# Patient Record
Sex: FEMALE | Race: WHITE | NOT HISPANIC OR LATINO | Employment: FULL TIME | URBAN - METROPOLITAN AREA
[De-identification: names, ages, dates, MRNs, and addresses within clinical notes are randomized per-mention and may not be internally consistent; named-entity substitution may affect disease eponyms.]

---

## 2024-01-22 ENCOUNTER — HOSPITAL ENCOUNTER (OUTPATIENT)
Facility: HOSPITAL | Age: 79
Discharge: HOME | End: 2024-01-22
Attending: STUDENT IN AN ORGANIZED HEALTH CARE EDUCATION/TRAINING PROGRAM | Admitting: STUDENT IN AN ORGANIZED HEALTH CARE EDUCATION/TRAINING PROGRAM
Payer: MEDICARE

## 2024-01-22 VITALS
WEIGHT: 185 LBS | HEART RATE: 70 BPM | HEIGHT: 65 IN | BODY MASS INDEX: 30.82 KG/M2 | SYSTOLIC BLOOD PRESSURE: 143 MMHG | TEMPERATURE: 98 F | DIASTOLIC BLOOD PRESSURE: 72 MMHG | RESPIRATION RATE: 16 BRPM | OXYGEN SATURATION: 95 %

## 2024-01-22 DIAGNOSIS — T78.3XXA ANGIOEDEMA OF TONGUE: Primary | ICD-10-CM

## 2024-01-22 PROBLEM — I49.3 PVC (PREMATURE VENTRICULAR CONTRACTION): Status: ACTIVE | Noted: 2024-01-22

## 2024-01-22 PROBLEM — I10 HTN (HYPERTENSION): Status: ACTIVE | Noted: 2024-01-22

## 2024-01-22 LAB
ALBUMIN SERPL-MCNC: 3.6 G/DL (ref 3.5–5.2)
ALBUMIN/GLOB SERPL: 1.2 G/DL
ALP SERPL-CCNC: 123 U/L (ref 39–117)
ALT SERPL W P-5'-P-CCNC: 18 U/L (ref 1–33)
ANION GAP SERPL CALCULATED.3IONS-SCNC: 11.2 MMOL/L (ref 5–15)
AST SERPL-CCNC: 17 U/L (ref 1–32)
BASOPHILS # BLD AUTO: 0.05 10*3/MM3 (ref 0–0.2)
BASOPHILS NFR BLD AUTO: 0.7 % (ref 0–1.5)
BILIRUB SERPL-MCNC: <0.2 MG/DL (ref 0–1.2)
BUN SERPL-MCNC: 17 MG/DL (ref 8–23)
BUN/CREAT SERPL: 23.3 (ref 7–25)
C3 SERPL-MCNC: 147 MG/DL (ref 82–167)
C4 SERPL-MCNC: 31 MG/DL (ref 14–44)
CALCIUM SPEC-SCNC: 9.4 MG/DL (ref 8.6–10.5)
CHLORIDE SERPL-SCNC: 105 MMOL/L (ref 98–107)
CO2 SERPL-SCNC: 24.8 MMOL/L (ref 22–29)
CREAT SERPL-MCNC: 0.73 MG/DL (ref 0.57–1)
CRP SERPL-MCNC: 0.36 MG/DL (ref 0–0.5)
DEPRECATED RDW RBC AUTO: 39.4 FL (ref 37–54)
EGFRCR SERPLBLD CKD-EPI 2021: 85.4 ML/MIN/1.73
EOSINOPHIL # BLD AUTO: 0.34 10*3/MM3 (ref 0–0.4)
EOSINOPHIL NFR BLD AUTO: 4.8 % (ref 0.3–6.2)
ERYTHROCYTE [DISTWIDTH] IN BLOOD BY AUTOMATED COUNT: 11.8 % (ref 12.3–15.4)
GLOBULIN UR ELPH-MCNC: 2.9 GM/DL
GLUCOSE SERPL-MCNC: 93 MG/DL (ref 65–99)
HCT VFR BLD AUTO: 36.2 % (ref 34–46.6)
HGB BLD-MCNC: 11.8 G/DL (ref 12–15.9)
IMM GRANULOCYTES # BLD AUTO: 0.02 10*3/MM3 (ref 0–0.05)
IMM GRANULOCYTES NFR BLD AUTO: 0.3 % (ref 0–0.5)
LYMPHOCYTES # BLD AUTO: 2.2 10*3/MM3 (ref 0.7–3.1)
LYMPHOCYTES NFR BLD AUTO: 30.8 % (ref 19.6–45.3)
MAGNESIUM SERPL-MCNC: 2 MG/DL (ref 1.6–2.4)
MCH RBC QN AUTO: 30.2 PG (ref 26.6–33)
MCHC RBC AUTO-ENTMCNC: 32.6 G/DL (ref 31.5–35.7)
MCV RBC AUTO: 92.6 FL (ref 79–97)
MONOCYTES # BLD AUTO: 0.82 10*3/MM3 (ref 0.1–0.9)
MONOCYTES NFR BLD AUTO: 11.5 % (ref 5–12)
NEUTROPHILS NFR BLD AUTO: 3.72 10*3/MM3 (ref 1.7–7)
NEUTROPHILS NFR BLD AUTO: 51.9 % (ref 42.7–76)
NRBC BLD AUTO-RTO: 0 /100 WBC (ref 0–0.2)
PLATELET # BLD AUTO: 210 10*3/MM3 (ref 140–450)
PMV BLD AUTO: 9.6 FL (ref 6–12)
POTASSIUM SERPL-SCNC: 4 MMOL/L (ref 3.5–5.2)
PROT SERPL-MCNC: 6.5 G/DL (ref 6–8.5)
RBC # BLD AUTO: 3.91 10*6/MM3 (ref 3.77–5.28)
SODIUM SERPL-SCNC: 141 MMOL/L (ref 136–145)
WBC NRBC COR # BLD AUTO: 7.15 10*3/MM3 (ref 3.4–10.8)

## 2024-01-22 PROCEDURE — 86160 COMPLEMENT ANTIGEN: CPT | Performed by: HOSPITALIST

## 2024-01-22 PROCEDURE — 86140 C-REACTIVE PROTEIN: CPT | Performed by: HOSPITALIST

## 2024-01-22 PROCEDURE — 96365 THER/PROPH/DIAG IV INF INIT: CPT

## 2024-01-22 PROCEDURE — 25010000002 DEXAMETHASONE PER 1 MG: Performed by: STUDENT IN AN ORGANIZED HEALTH CARE EDUCATION/TRAINING PROGRAM

## 2024-01-22 PROCEDURE — 83735 ASSAY OF MAGNESIUM: CPT | Performed by: HOSPITALIST

## 2024-01-22 PROCEDURE — G0378 HOSPITAL OBSERVATION PER HR: HCPCS

## 2024-01-22 PROCEDURE — 96376 TX/PRO/DX INJ SAME DRUG ADON: CPT

## 2024-01-22 PROCEDURE — A9270 NON-COVERED ITEM OR SERVICE: HCPCS | Performed by: HOSPITALIST

## 2024-01-22 PROCEDURE — 99291 CRITICAL CARE FIRST HOUR: CPT

## 2024-01-22 PROCEDURE — 25010000002 EPINEPHRINE PER 0.1 MG: Performed by: STUDENT IN AN ORGANIZED HEALTH CARE EDUCATION/TRAINING PROGRAM

## 2024-01-22 PROCEDURE — 63710000001 PREDNISONE PER 1 MG: Performed by: HOSPITALIST

## 2024-01-22 PROCEDURE — 25010000002 DIPHENHYDRAMINE PER 50 MG: Performed by: STUDENT IN AN ORGANIZED HEALTH CARE EDUCATION/TRAINING PROGRAM

## 2024-01-22 PROCEDURE — 85025 COMPLETE CBC W/AUTO DIFF WBC: CPT | Performed by: STUDENT IN AN ORGANIZED HEALTH CARE EDUCATION/TRAINING PROGRAM

## 2024-01-22 PROCEDURE — 25010000002 DIPHENHYDRAMINE PER 50 MG: Performed by: HOSPITALIST

## 2024-01-22 PROCEDURE — 80053 COMPREHEN METABOLIC PANEL: CPT | Performed by: STUDENT IN AN ORGANIZED HEALTH CARE EDUCATION/TRAINING PROGRAM

## 2024-01-22 PROCEDURE — 63710000001 CETIRIZINE 10 MG TABLET: Performed by: HOSPITALIST

## 2024-01-22 PROCEDURE — 96375 TX/PRO/DX INJ NEW DRUG ADDON: CPT

## 2024-01-22 PROCEDURE — 96372 THER/PROPH/DIAG INJ SC/IM: CPT

## 2024-01-22 RX ORDER — LOSARTAN POTASSIUM 50 MG/1
100 TABLET ORAL DAILY
COMMUNITY
End: 2024-01-22 | Stop reason: HOSPADM

## 2024-01-22 RX ORDER — SODIUM CHLORIDE 9 MG/ML
40 INJECTION, SOLUTION INTRAVENOUS AS NEEDED
Status: DISCONTINUED | OUTPATIENT
Start: 2024-01-22 | End: 2024-01-22 | Stop reason: HOSPADM

## 2024-01-22 RX ORDER — SODIUM CHLORIDE 0.9 % (FLUSH) 0.9 %
10 SYRINGE (ML) INJECTION AS NEEDED
Status: DISCONTINUED | OUTPATIENT
Start: 2024-01-22 | End: 2024-01-22 | Stop reason: HOSPADM

## 2024-01-22 RX ORDER — OMEPRAZOLE 20 MG/1
20 CAPSULE, DELAYED RELEASE ORAL DAILY
COMMUNITY

## 2024-01-22 RX ORDER — PREDNISONE 20 MG/1
20 TABLET ORAL DAILY
Qty: 3 TABLET | Refills: 0 | Status: SHIPPED | OUTPATIENT
Start: 2024-01-22

## 2024-01-22 RX ORDER — CARVEDILOL 3.12 MG/1
3.12 TABLET ORAL 2 TIMES DAILY WITH MEALS
Qty: 60 TABLET | Refills: 0 | Status: SHIPPED | OUTPATIENT
Start: 2024-01-22

## 2024-01-22 RX ORDER — OMEGA-3-ACID ETHYL ESTERS 1 G/1
2 CAPSULE, LIQUID FILLED ORAL 2 TIMES DAILY
COMMUNITY

## 2024-01-22 RX ORDER — DEXAMETHASONE SODIUM PHOSPHATE 10 MG/ML
8 INJECTION INTRAMUSCULAR; INTRAVENOUS ONCE
Status: COMPLETED | OUTPATIENT
Start: 2024-01-22 | End: 2024-01-22

## 2024-01-22 RX ORDER — FAMOTIDINE 10 MG/ML
20 INJECTION, SOLUTION INTRAVENOUS EVERY 12 HOURS SCHEDULED
Status: DISCONTINUED | OUTPATIENT
Start: 2024-01-22 | End: 2024-01-22 | Stop reason: HOSPADM

## 2024-01-22 RX ORDER — DIPHENHYDRAMINE HYDROCHLORIDE 50 MG/ML
25 INJECTION INTRAMUSCULAR; INTRAVENOUS EVERY 6 HOURS
Status: DISCONTINUED | OUTPATIENT
Start: 2024-01-22 | End: 2024-01-22 | Stop reason: HOSPADM

## 2024-01-22 RX ORDER — BUTALBITAL, ACETAMINOPHEN AND CAFFEINE 50; 325; 40 MG/1; MG/1; MG/1
1 TABLET ORAL EVERY 4 HOURS PRN
COMMUNITY

## 2024-01-22 RX ORDER — FAMOTIDINE 10 MG/ML
20 INJECTION, SOLUTION INTRAVENOUS ONCE
Status: COMPLETED | OUTPATIENT
Start: 2024-01-22 | End: 2024-01-22

## 2024-01-22 RX ORDER — CETIRIZINE HYDROCHLORIDE 10 MG/1
10 TABLET ORAL DAILY
Qty: 3 TABLET | Refills: 0 | Status: SHIPPED | OUTPATIENT
Start: 2024-01-23

## 2024-01-22 RX ORDER — TRANEXAMIC ACID 10 MG/ML
1000 INJECTION, SOLUTION INTRAVENOUS ONCE
Status: COMPLETED | OUTPATIENT
Start: 2024-01-22 | End: 2024-01-22

## 2024-01-22 RX ORDER — DIPHENHYDRAMINE HYDROCHLORIDE 50 MG/ML
25 INJECTION INTRAMUSCULAR; INTRAVENOUS ONCE
Status: COMPLETED | OUTPATIENT
Start: 2024-01-22 | End: 2024-01-22

## 2024-01-22 RX ORDER — SODIUM CHLORIDE 0.9 % (FLUSH) 0.9 %
10 SYRINGE (ML) INJECTION EVERY 12 HOURS SCHEDULED
Status: DISCONTINUED | OUTPATIENT
Start: 2024-01-22 | End: 2024-01-22 | Stop reason: HOSPADM

## 2024-01-22 RX ORDER — FAMOTIDINE 20 MG/1
20 TABLET, FILM COATED ORAL 2 TIMES DAILY
Qty: 6 TABLET | Refills: 0 | Status: SHIPPED | OUTPATIENT
Start: 2024-01-22

## 2024-01-22 RX ORDER — ACETAMINOPHEN 325 MG/1
650 TABLET ORAL EVERY 4 HOURS PRN
Status: DISCONTINUED | OUTPATIENT
Start: 2024-01-22 | End: 2024-01-22 | Stop reason: HOSPADM

## 2024-01-22 RX ORDER — THYROID 60 MG/1
60 TABLET ORAL DAILY
COMMUNITY

## 2024-01-22 RX ORDER — PREDNISONE 20 MG/1
40 TABLET ORAL
Status: DISCONTINUED | OUTPATIENT
Start: 2024-01-22 | End: 2024-01-22 | Stop reason: HOSPADM

## 2024-01-22 RX ORDER — CETIRIZINE HYDROCHLORIDE 10 MG/1
10 TABLET ORAL DAILY
Status: DISCONTINUED | OUTPATIENT
Start: 2024-01-22 | End: 2024-01-22 | Stop reason: HOSPADM

## 2024-01-22 RX ORDER — PROPRANOLOL HCL 60 MG
60 CAPSULE, EXTENDED RELEASE 24HR ORAL DAILY
COMMUNITY
End: 2024-01-22 | Stop reason: HOSPADM

## 2024-01-22 RX ORDER — CARVEDILOL 3.12 MG/1
3.12 TABLET ORAL 2 TIMES DAILY WITH MEALS
Status: DISCONTINUED | OUTPATIENT
Start: 2024-01-22 | End: 2024-01-22 | Stop reason: HOSPADM

## 2024-01-22 RX ADMIN — DEXAMETHASONE SODIUM PHOSPHATE 8 MG: 10 INJECTION INTRAMUSCULAR; INTRAVENOUS at 05:23

## 2024-01-22 RX ADMIN — DIPHENHYDRAMINE HYDROCHLORIDE 25 MG: 50 INJECTION, SOLUTION INTRAMUSCULAR; INTRAVENOUS at 05:22

## 2024-01-22 RX ADMIN — DIPHENHYDRAMINE HYDROCHLORIDE 25 MG: 50 INJECTION, SOLUTION INTRAMUSCULAR; INTRAVENOUS at 10:27

## 2024-01-22 RX ADMIN — FAMOTIDINE 20 MG: 10 INJECTION INTRAVENOUS at 10:28

## 2024-01-22 RX ADMIN — EPINEPHRINE 0.3 MG: 1 INJECTION, SOLUTION, CONCENTRATE INTRAVENOUS at 05:41

## 2024-01-22 RX ADMIN — CETIRIZINE HYDROCHLORIDE 10 MG: 10 TABLET ORAL at 11:58

## 2024-01-22 RX ADMIN — Medication 10 ML: at 10:28

## 2024-01-22 RX ADMIN — PREDNISONE 40 MG: 20 TABLET ORAL at 11:58

## 2024-01-22 RX ADMIN — TRANEXAMIC ACID 1000 MG: 10 INJECTION, SOLUTION INTRAVENOUS at 05:38

## 2024-01-22 RX ADMIN — FAMOTIDINE 20 MG: 10 INJECTION INTRAVENOUS at 05:24

## 2024-01-22 NOTE — DISCHARGE SUMMARY
"    Patient Name: Antionette Campuzano  : 1945  MRN: [id number]    Date of Admission: 2024  Date of Discharge:  2024  Primary Care Physician: Sacha Conroy MD      Chief Complaint:   Oral Swelling      Discharge Diagnoses     Active Hospital Problems    Diagnosis  POA    **Angioedema of tongue [T78.3XXA]  Yes    HTN (hypertension) [I10]  Yes    PVC (premature ventricular contraction) [I49.3]  Yes      Resolved Hospital Problems   No resolved problems to display.        Hospital Course     Ms. Campuzano is a 78 y.o. female with a history of HTN presenting with acute onset of tongue swelling as described in the H&P.  This occurred around 3 AM and she presented here a couple of hours later.  She was placed in observation due to concerns for airway given the tongue swelling although she had started to improve slightly by the time she left the emergency room.  After getting to the floor she has had dramatic improvement in her tongue swelling.  From the time of my first evaluation for this morning to now she has had significant decrease in the tongue swelling and now says her tongue feels normal.  She is stable for discharge at this time.  I will continue prednisone for couple of days and also encouraged her to continue cetirizine and Pepcid.  She can use Benadryl as needed although she says it makes her feel \"hyper\" and she does not like to take it.  She is going to need something to replace the losartan.  I noted numerous PVCs on her cardiac monitor and I think she would benefit from a beta-blocker that would have better activity for blood pressure control like carvedilol.  I have started that now.  She is probably needs another agent as well and I would consider either diuretic or amlodipine but she would like to discuss that further with her primary care physician after discharge which I think is reasonable.  Plan to discharge home this afternoon.  I encouraged her to follow-up with an allergist after " "discharge as well which she is very motivated to do    Day of Discharge     Subjective:  Feeling normal now    Physical Exam:  Temp:  [97.3 °F (36.3 °C)-98 °F (36.7 °C)] 98 °F (36.7 °C)  Heart Rate:  [63-75] 70  Resp:  [16-18] 16  BP: (163-190)/(73-85) 172/73  Body mass index is 30.79 kg/m².  Physical Exam  Vitals reviewed.   HENT:      Mouth/Throat:      Comments: Tongue appears relatively normal, decreased in size from 3 to 4 hours ago when I saw her last.  Neurological:      Mental Status: She is alert.         Consultants     Consult Orders (all) (From admission, onward)       Start     Ordered    01/22/24 0630  [facility] (on-call MD unless specified) Details  Once        Specialty:  Hospitalist  Provider:  (Not yet assigned)    01/22/24 0629    01/22/24 0619  [facility] (on-call MD unless specified) Details  Once        Specialty:  Hospitalist  Provider:  (Not yet assigned)    01/22/24 0618                  Procedures       Imaging Results (All)       None              Pertinent Labs     Results from last 7 days   Lab Units 01/22/24  0538   WBC 10*3/mm3 7.15   HEMOGLOBIN g/dL 11.8*   PLATELETS 10*3/mm3 210     Results from last 7 days   Lab Units 01/22/24  0538   SODIUM mmol/L 141   POTASSIUM mmol/L 4.0   CHLORIDE mmol/L 105   CO2 mmol/L 24.8   BUN mg/dL 17   CREATININE mg/dL 0.73   GLUCOSE mg/dL 93   EGFR mL/min/1.73 85.4     Results from last 7 days   Lab Units 01/22/24  0538   ALBUMIN g/dL 3.6   BILIRUBIN mg/dL <0.2   ALK PHOS U/L 123*   AST (SGOT) U/L 17   ALT (SGPT) U/L 18     Results from last 7 days   Lab Units 01/22/24  0538   CALCIUM mg/dL 9.4   ALBUMIN g/dL 3.6   MAGNESIUM mg/dL 2.0               Invalid input(s): \"LDLCALC\"          Test Results Pending at Discharge       Discharge Details        Discharge Medications        New Medications        Instructions Start Date   carvedilol 3.125 MG tablet  Commonly known as: COREG   3.125 mg, Oral, 2 Times Daily With Meals      cetirizine 10 MG tablet  Commonly known " as: zyrTEC   10 mg, Oral, Daily   Start Date: January 23, 2024     famotidine 20 MG tablet  Commonly known as: Pepcid   20 mg, Oral, 2 Times Daily      predniSONE 20 MG tablet  Commonly known as: DELTASONE   20 mg, Oral, Daily             Continue These Medications        Instructions Start Date   butalbital-acetaminophen-caffeine -40 MG per tablet  Commonly known as: FIORICET, ESGIC   1 tablet, Oral, Every 4 Hours PRN      Estrogens Conjugated 0.625 MG/GM vaginal cream  Commonly known as: PREMARIN   1 g, Vaginal, Daily      Lovaza 1 g capsule  Generic drug: omega-3 acid ethyl esters   2 g, Oral, 2 Times Daily      omeprazole 20 MG capsule  Commonly known as: priLOSEC   20 mg, Oral, Daily      Thyroid 60 MG tablet  Commonly known as: ARMOUR   60 mg, Oral, Daily             Stop These Medications      losartan 50 MG tablet  Commonly known as: COZAAR     propranolol LA 60 MG 24 hr capsule  Commonly known as: INDERAL LA              No Known Allergies    Discharge Disposition:  Home or Self Care      Discharge Diet:  Diet Order   Procedures    Diet: Regular/House Diet; Texture: Regular Texture (IDDSI 7); Fluid Consistency: Thin (IDDSI 0)       Discharge Activity:       CODE STATUS:    Code Status and Medical Interventions:   Ordered at: 01/22/24 0848     Code Status (Patient has no pulse and is not breathing):    CPR (Attempt to Resuscitate)     Medical Interventions (Patient has pulse or is breathing):    Full Support       No future appointments.   Follow-up Information       Sacha Conroy MD .    Specialty: Internal Medicine  Contact information:  [facility] [address] [facility] [address] [phone]                             Time Spent on Discharge:  Greater than 30 minutes      Ed Silvestre MD  [facility]  01/22/24  14:48 EST

## 2024-01-22 NOTE — PLAN OF CARE
Goal Outcome Evaluation:   Pt admitted to unit for Angioedema-tolerating foods and liquid with minimal tongue edema. GCS 15-remains on room air, denies pain or shortness of air. Will continue to monitor.

## 2024-01-22 NOTE — ED PROVIDER NOTES
EMERGENCY DEPARTMENT ENCOUNTER    Room Number:  12/12  PCP: Sacha Conroy MD  History obtained from: Patient      HPI:  Chief Complaint: Tongue swelling  A complete HPI/ROS/PMH/PSH/SH/FH are unobtainable due to: Not applicable  Context: Antionette Campuzano is a 78 y.o. female who presents to the ED c/o tongue swelling, difficulty swallowing.  Started last night.  Has never had this before.  Medications or new exposures that she is aware of.  No history of food allergies.  Currently on losartan.            PAST MEDICAL HISTORY  Active Ambulatory Problems     Diagnosis Date Noted    No Active Ambulatory Problems     Resolved Ambulatory Problems     Diagnosis Date Noted    No Resolved Ambulatory Problems     No Additional Past Medical History         PAST SURGICAL HISTORY  No past surgical history on file.      FAMILY HISTORY  No family history on file.      SOCIAL HISTORY  Social History     Socioeconomic History    Marital status:          ALLERGIES  Patient has no known allergies.        REVIEW OF SYSTEMS    As per HPI      PHYSICAL EXAM  ED Triage Vitals   Temp Heart Rate Resp BP SpO2   01/22/24 0456 01/22/24 0456 01/22/24 0456 01/22/24 0503 01/22/24 0456   97.3 °F (36.3 °C) 75 18 (!) 190/84 95 %      Temp src Heart Rate Source Patient Position BP Location FiO2 (%)   01/22/24 0456 01/22/24 0456 01/22/24 0503 01/22/24 0503 --   Tympanic Monitor Lying Right arm        Physical Exam  Constitutional:       General: She is not in acute distress.  HENT:      Head: Normocephalic and atraumatic.      Mouth/Throat:      Comments: Significant swelling of the tongue with slight muffling of voice, managing secretions without difficulty, no stridor  Cardiovascular:      Rate and Rhythm: Normal rate and regular rhythm.   Pulmonary:      Effort: Pulmonary effort is normal. No respiratory distress.   Abdominal:      General: There is no distension.      Palpations: Abdomen is soft.      Tenderness: There is no abdominal  tenderness.   Musculoskeletal:         General: No swelling or deformity.   Skin:     General: Skin is warm and dry.   Neurological:      Mental Status: She is alert. Mental status is at baseline.           Vital signs and nursing notes reviewed.          LAB RESULTS  Recent Results (from the past 24 hour(s))   Comprehensive Metabolic Panel    Collection Time: 01/22/24  5:38 AM    Specimen: Blood   Result Value Ref Range    Glucose 93 65 - 99 mg/dL    BUN 17 8 - 23 mg/dL    Creatinine 0.73 0.57 - 1.00 mg/dL    Sodium 141 136 - 145 mmol/L    Potassium 4.0 3.5 - 5.2 mmol/L    Chloride 105 98 - 107 mmol/L    CO2 24.8 22.0 - 29.0 mmol/L    Calcium 9.4 8.6 - 10.5 mg/dL    Total Protein 6.5 6.0 - 8.5 g/dL    Albumin 3.6 3.5 - 5.2 g/dL    ALT (SGPT) 18 1 - 33 U/L    AST (SGOT) 17 1 - 32 U/L    Alkaline Phosphatase 123 (H) 39 - 117 U/L    Total Bilirubin <0.2 0.0 - 1.2 mg/dL    Globulin 2.9 gm/dL    A/G Ratio 1.2 g/dL    BUN/Creatinine Ratio 23.3 7.0 - 25.0    Anion Gap 11.2 5.0 - 15.0 mmol/L    eGFR 85.4 >60.0 mL/min/1.73   CBC Auto Differential    Collection Time: 01/22/24  5:38 AM    Specimen: Blood   Result Value Ref Range    WBC 7.15 3.40 - 10.80 10*3/mm3    RBC 3.91 3.77 - 5.28 10*6/mm3    Hemoglobin 11.8 (L) 12.0 - 15.9 g/dL    Hematocrit 36.2 34.0 - 46.6 %    MCV 92.6 79.0 - 97.0 fL    MCH 30.2 26.6 - 33.0 pg    MCHC 32.6 31.5 - 35.7 g/dL    RDW 11.8 (L) 12.3 - 15.4 %    RDW-SD 39.4 37.0 - 54.0 fl    MPV 9.6 6.0 - 12.0 fL    Platelets 210 140 - 450 10*3/mm3    Neutrophil % 51.9 42.7 - 76.0 %    Lymphocyte % 30.8 19.6 - 45.3 %    Monocyte % 11.5 5.0 - 12.0 %    Eosinophil % 4.8 0.3 - 6.2 %    Basophil % 0.7 0.0 - 1.5 %    Immature Grans % 0.3 0.0 - 0.5 %    Neutrophils, Absolute 3.72 1.70 - 7.00 10*3/mm3    Lymphocytes, Absolute 2.20 0.70 - 3.10 10*3/mm3    Monocytes, Absolute 0.82 0.10 - 0.90 10*3/mm3    Eosinophils, Absolute 0.34 0.00 - 0.40 10*3/mm3    Basophils, Absolute 0.05 0.00 - 0.20 10*3/mm3    Immature  Grans, Absolute 0.02 0.00 - 0.05 10*3/mm3    nRBC 0.0 0.0 - 0.2 /100 WBC       Ordered the above labs and reviewed the results.        RADIOLOGY  No Radiology Exams Resulted Within Past 24 Hours    Ordered the above noted radiological studies. Reviewed by me in PACS.            PROCEDURES  Critical Care    Performed by: Jimmy Lemus MD  Authorized by: Jimmy Lemus MD    Critical care provider statement:     Critical care time (minutes):  36    Critical care time was exclusive of:  Separately billable procedures and treating other patients and teaching time    Critical care was necessary to treat or prevent imminent or life-threatening deterioration of the following conditions:  Respiratory failure    Critical care was time spent personally by me on the following activities:  Development of treatment plan with patient or surrogate, discussions with consultants, evaluation of patient's response to treatment, examination of patient, ordering and performing treatments and interventions, ordering and review of laboratory studies, ordering and review of radiographic studies, pulse oximetry and re-evaluation of patient's condition    Care discussed with: admitting provider          MEDICATIONS GIVEN IN ER  Medications   EPINEPHrine (ANAPHYLAXIS) 1 mg/ml injection kit (0.3 mg Intramuscular Not Given 1/22/24 0546)   tranexamic acid 1000 mg in 100 mL 0.7% NaCl infusion (premix) (1,000 mg Intravenous New Bag 1/22/24 0538)   EPINEPHrine (ANAPHYLAXIS) 1 mg/ml injection kit (0.3 mg Intramuscular Given 1/22/24 0541)   dexAMETHasone (DECADRON) injection 8 mg (8 mg Intravenous Given 1/22/24 0523)   famotidine (PEPCID) injection 20 mg (20 mg Intravenous Given 1/22/24 0524)   diphenhydrAMINE (BENADRYL) injection 25 mg (25 mg Intravenous Given 1/22/24 0522)               MEDICAL DECISION MAKING, PROGRESS, and CONSULTS    MDM: Patient presented emergency department with significant tongue edema, voice changes.  Is well-appearing,  vitals otherwise stable.  Labs otherwise reassuring.  Treated with TXA, Decadron, epi, antihistamines.  Unclear etiology, suspect bradykinin induced although unclear trigger.  Observed in the emergency department with no worsening of symptoms, possible slight improvement.  Discussed with inpatient team, will admit for observation and further airway management as needed.    All labs have been independently reviewed by me.  All radiology studies have been reviewed by me and I have also reviewed the radiology report.   EKG's independently viewed and interpreted by me.  Discussion below represents my analysis of pertinent findings related to patient's condition, differential diagnosis, treatment plan and final disposition.      Additional sources:  - Discussed/ obtained information from independent historians:      - External (non-ED) record review:     - Chronic or social conditions impacting care:     - Shared decision making: Discussed plan for admission for observation, patient agrees      Orders placed during this visit:  Orders Placed This Encounter   Procedures    Comprehensive Metabolic Panel    CBC Auto Differential    [facility] (on-call MD unless specified) Details    [facility] (on-call MD unless specified) Details    Initiate Observation Status    CBC & Differential         Additional orders considered but not ordered:  Considered CT neck for further evaluation of the glottic structures however would prefer direct visualization if needed, will defer for now.        Differential diagnosis includes but is not limited to:    Angioedema, hereditary angioedema, medication induced angioedema, allergic angioedema, allergy, anaphylaxis      Independent interpretation of labs, radiology studies, and discussions with consultants:           DIAGNOSIS  Final diagnoses:   Angioedema of tongue         DISPOSITION  Admitted to telemetry        Latest Documented Vital Signs:  As of 06:39 EST  BP- (!) 190/84 HR- 75 Temp- 97.3 °F (36.3  °C) (Tympanic) O2 sat- 95%              --    Please note that portions of this were completed with a voice recognition program.       Note Disclaimer: At [facility], we believe that sharing information builds trust and better relationships. You are receiving this note because you are receiving care at [facility] or recently visited. It is possible you will see health information before a provider has talked with you about it. This kind of information can be easy to misunderstand. To help you fully understand what it means for your health, we urge you to discuss this note with your provider.             Jimmy Lemus MD  01/22/24 0609

## 2024-01-22 NOTE — CASE MANAGEMENT/SOCIAL WORK
Discharge Planning Assessment  [facility]     Patient Name: Antionette Campuzano  MRN: [id number]  Today's Date: 1/22/2024    Admit Date: 1/22/2024    Plan: Home, family to transport   Discharge Needs Assessment       Row Name 01/22/24 1302       Living Environment    People in Home spouse    Current Living Arrangements home    In the past 12 months has the electric, gas, oil, or water company threatened to shut off services in your home? Yes    Primary Care Provided by self    Provides Primary Care For no one    Family Caregiver if Needed spouse    Able to Return to Prior Arrangements yes       Resource/Environmental Concerns    Resource/Environmental Concerns none       Transition Planning    Patient/Family Anticipates Transition to home with family    Patient/Family Anticipated Services at Transition none    Transportation Anticipated family or friend will provide       Discharge Needs Assessment    Equipment Currently Used at Home none    Concerns to be Addressed discharge planning    Equipment Needed After Discharge none                   Discharge Plan       Row Name 01/22/24 5255       Plan    Plan Home, family to transport    Patient/Family in Agreement with Plan yes    Plan Comments CCP spoke with patient at bedside; explained role, verified facesheet, and discussed dc plan. Patient uses no DME and is independent for mobility at baseline. She lives with her spouse in a 3 level home with 12 steps to her bedroom and 3 steps to enter. She reports no trouble with ambulation throughout her home. She has no history of HH or SNF. Patient reports plan is home and denies any HH or DME needs. Family to transport. SAMY CASTILLO                  Continued Care and Services - Admitted Since 1/22/2024    Coordination has not been started for this encounter.          Demographic Summary       Row Name 01/22/24 1308       General Information    Admission Type observation    Arrived From home    Referral Source admission list     Reason for Consult discharge planning    Preferred Language English       Contact Information    Permission Granted to Share Info With family/designee                   Functional Status       Row Name 01/22/24 1301       Functional Status    Usual Activity Tolerance good    Current Activity Tolerance good       Functional Status, IADL    Medications independent    Meal Preparation independent    Housekeeping independent    Laundry independent    Shopping independent       Mental Status    General Appearance WDL WDL                   Psychosocial    No documentation.                  Abuse/Neglect    No documentation.                  Legal    No documentation.                  Substance Abuse    No documentation.                  Patient Forms    No documentation.                     SAMY Tamayo

## 2024-01-22 NOTE — CASE MANAGEMENT/SOCIAL WORK
Case Management Discharge Note      Final Note: home no needs         Selected Continued Care - Discharged on 1/22/2024 Admission date: 1/22/2024 - Discharge disposition: Home or Self Care      Destination    No services have been selected for the patient.                Durable Medical Equipment    No services have been selected for the patient.                Dialysis/Infusion    No services have been selected for the patient.                Home Medical Care    No services have been selected for the patient.                Therapy    No services have been selected for the patient.                Community Resources    No services have been selected for the patient.                Community & DME    No services have been selected for the patient.                         Final Discharge Disposition Code: 01 - home or self-care

## 2024-01-22 NOTE — ED TRIAGE NOTES
"Pt arrives ambulatory to triage desk via PV.    Pt states \"I went to a dinner party last night, and woke up with my tongue swollen about an hour ago.\"    Pt is having difficulty speaking and is having a hard time keeping her secretions down. Pt denies any allergies at this time, reports taking losartan and propanolol for BP.  "

## 2024-01-22 NOTE — ED NOTES
"Nursing report ED to floor  Antionette Campuzano  78 y.o.  female    HPI :   Chief Complaint   Patient presents with    Oral Swelling       Admitting doctor:   Ed Silvestre MD    Admitting diagnosis:   The encounter diagnosis was Angioedema of tongue.    Code status:   Current Code Status       Date Active Code Status Order ID Comments User Context       Not on file            Allergies:   Patient has no known allergies.    Isolation:   No active isolations    Intake and Output  No intake or output data in the 24 hours ending 01/22/24 0645    Weight:       01/22/24 0456   Weight: 83.9 kg (185 lb)       Most recent vitals:   Vitals:    01/22/24 0456 01/22/24 0503   BP:  (!) 190/84   BP Location:  Right arm   Patient Position:  Lying   Pulse: 75    Resp: 18    Temp: 97.3 °F (36.3 °C)    TempSrc: Tympanic    SpO2: 95%    Weight: 83.9 kg (185 lb)    Height: 165.1 cm (65\")        Active LDAs/IV Access:   Lines, Drains & Airways       Active LDAs       Name Placement date Placement time Site Days    Peripheral IV 01/22/24 0522 Right Antecubital 01/22/24 0522  Antecubital  less than 1                    Labs (abnormal labs have a star):   Labs Reviewed   COMPREHENSIVE METABOLIC PANEL - Abnormal; Notable for the following components:       Result Value    Alkaline Phosphatase 123 (*)     All other components within normal limits    Narrative:     GFR Normal >60  Chronic Kidney Disease <60  Kidney Failure <15    The GFR formula is only valid for adults with stable renal function between ages 18 and 70.   CBC WITH AUTO DIFFERENTIAL - Abnormal; Notable for the following components:    Hemoglobin 11.8 (*)     RDW 11.8 (*)     All other components within normal limits   CBC AND DIFFERENTIAL    Narrative:     The following orders were created for panel order CBC & Differential.  Procedure                               Abnormality         Status                     ---------                               -----------        "  ------                     CBC Auto Differential[[id number]]        Abnormal            Final result                 Please view results for these tests on the individual orders.       EKG:   No orders to display       Meds given in ED:   Medications   EPINEPHrine (ANAPHYLAXIS) 1 mg/ml injection kit (0.3 mg Intramuscular Not Given 1/22/24 0546)   tranexamic acid 1000 mg in 100 mL 0.7% NaCl infusion (premix) (1,000 mg Intravenous New Bag 1/22/24 0538)   EPINEPHrine (ANAPHYLAXIS) 1 mg/ml injection kit (0.3 mg Intramuscular Given 1/22/24 0541)   dexAMETHasone (DECADRON) injection 8 mg (8 mg Intravenous Given 1/22/24 0523)   famotidine (PEPCID) injection 20 mg (20 mg Intravenous Given 1/22/24 0524)   diphenhydrAMINE (BENADRYL) injection 25 mg (25 mg Intravenous Given 1/22/24 0522)       Imaging results:  No radiology results for the last day    Ambulatory status:   - up at yan    Social issues:   Social History     Socioeconomic History    Marital status:        NIH Stroke Scale:       Barbara Linn RN  01/22/24 06:45 EST

## 2024-01-22 NOTE — H&P
"    Patient Name:  Antionette Campuzano  YOB: 1945  MRN:  [id number]  Admit Date:  1/22/2024  Patient Care Team:  Sacha Conroy MD as PCP - General (Internal Medicine)      Subjective   History Present Illness     Chief Complaint   Patient presents with    Oral Swelling       Ms. Campuzano is a 78 y.o. female with history of hypertension presenting today with acute onset of tongue swelling.  It woke her up from sleep around 3 AM.  It was not painful but she just said she could tell something was wrong because her tongue \"felt funny\".  She started to feel some unusual sensation in the roof of her mouth as well which is what finally prompted her to come to the emergency room.  She has not recently had any new medications.  She took an aspirin on Saturday for some joint pain but she has taken it many times in the past.  She is on losartan but no ACE inhibitor.  Previously she did have a cough when she was on one of the ACE inhibitor's but she cannot remember which one.  She does not have any other symptoms, denies nausea, vomiting or abdominal pain.  In the emergency room she was noted to have a very swollen tongue but was not having any signs of airway compromise.  She was loaded with epinephrine, Decadron, Pepcid and was observed with notation of some improvement but they feel she needed further observation and asked us to admit.  She has continued to improve since that time though still not back to baseline      Review of Systems   Constitutional:  Negative for chills, fatigue and fever.   HENT:  Positive for voice change. Negative for congestion and trouble swallowing.    Eyes:  Negative for visual disturbance.   Respiratory:  Negative for cough, chest tightness and shortness of breath.    Cardiovascular:  Negative for chest pain, palpitations and leg swelling.   Gastrointestinal:  Negative for abdominal distention, abdominal pain, constipation, diarrhea, nausea and vomiting.   Genitourinary:  Negative " for difficulty urinating, dysuria and frequency.   Musculoskeletal:  Negative for arthralgias.   Skin:  Negative for rash.   Neurological:  Negative for dizziness and headaches.   Psychiatric/Behavioral:  Negative for confusion.         Personal History     History reviewed. No pertinent past medical history.  Past Surgical History:   Procedure Laterality Date    COSMETIC SURGERY      face lift 2019     History reviewed. No pertinent family history.  Social History     Tobacco Use    Smoking status: Never    Smokeless tobacco: Never   Vaping Use    Vaping Use: Never used   Substance Use Topics    Alcohol use: Never    Drug use: Never     No current facility-administered medications on file prior to encounter.     No current outpatient medications on file prior to encounter.     No Known Allergies    Objective    Objective     Vital Signs  Temp:  [97.3 °F (36.3 °C)-97.7 °F (36.5 °C)] 97.7 °F (36.5 °C)  Heart Rate:  [63-75] 64  Resp:  [16-18] 16  BP: (163-190)/(80-85) 185/80  SpO2:  [94 %-98 %] 94 %  on   ;   Device (Oxygen Therapy): room air  Body mass index is 30.79 kg/m².    Physical Exam  Vitals reviewed.   Constitutional:       General: She is not in acute distress.  HENT:      Mouth/Throat:      Comments: Tongue swelling noted.  Cannot visualize the back of the oropharynx.  No trauma.  No obvious swelling of the palate or buccal mucosa.  Eyes:      General: Lids are normal.   Cardiovascular:      Rate and Rhythm: Normal rate. Rhythm irregular.      Heart sounds: No murmur heard.  Pulmonary:      Effort: Pulmonary effort is normal. No respiratory distress.      Breath sounds: Normal breath sounds.   Abdominal:      General: There is no distension.      Palpations: Abdomen is soft.      Tenderness: There is no abdominal tenderness.   Musculoskeletal:      Cervical back: Full passive range of motion without pain. No edema. No muscular tenderness.      Right lower leg: No edema.      Left lower leg: No edema.    Lymphadenopathy:      Cervical: No cervical adenopathy.   Skin:     General: Skin is warm and dry.      Findings: No rash.   Neurological:      Mental Status: She is alert.         Results Review:  I reviewed the patient's new clinical results.  I reviewed the patient's new imaging results and agree with the interpretation.  I reviewed the patient's other test results and agree with the interpretation  I personally viewed and interpreted the patient's EKG/Telemetry data  Discussed with ED provider.    Lab Results (last 24 hours)       Procedure Component Value Units Date/Time    CBC & Differential [[id number]]  (Abnormal) Collected: 01/22/24 0538    Specimen: Blood Updated: 01/22/24 0557    Narrative:      The following orders were created for panel order CBC & Differential.  Procedure                               Abnormality         Status                     ---------                               -----------         ------                     CBC Auto Differential[[id number]]        Abnormal            Final result                 Please view results for these tests on the individual orders.    Comprehensive Metabolic Panel [[id number]]  (Abnormal) Collected: 01/22/24 0538    Specimen: Blood Updated: 01/22/24 0613     Glucose 93 mg/dL      BUN 17 mg/dL      Creatinine 0.73 mg/dL      Sodium 141 mmol/L      Potassium 4.0 mmol/L      Chloride 105 mmol/L      CO2 24.8 mmol/L      Calcium 9.4 mg/dL      Total Protein 6.5 g/dL      Albumin 3.6 g/dL      ALT (SGPT) 18 U/L      AST (SGOT) 17 U/L      Alkaline Phosphatase 123 U/L      Total Bilirubin <0.2 mg/dL      Globulin 2.9 gm/dL      A/G Ratio 1.2 g/dL      BUN/Creatinine Ratio 23.3     Anion Gap 11.2 mmol/L      eGFR 85.4 mL/min/1.73     Narrative:      GFR Normal >60  Chronic Kidney Disease <60  Kidney Failure <15    The GFR formula is only valid for adults with stable renal function between ages 18 and 70.    CBC Auto Differential [[id number]]  (Abnormal)  Collected: 01/22/24 0538    Specimen: Blood Updated: 01/22/24 0557     WBC 7.15 10*3/mm3      RBC 3.91 10*6/mm3      Hemoglobin 11.8 g/dL      Hematocrit 36.2 %      MCV 92.6 fL      MCH 30.2 pg      MCHC 32.6 g/dL      RDW 11.8 %      RDW-SD 39.4 fl      MPV 9.6 fL      Platelets 210 10*3/mm3      Neutrophil % 51.9 %      Lymphocyte % 30.8 %      Monocyte % 11.5 %      Eosinophil % 4.8 %      Basophil % 0.7 %      Immature Grans % 0.3 %      Neutrophils, Absolute 3.72 10*3/mm3      Lymphocytes, Absolute 2.20 10*3/mm3      Monocytes, Absolute 0.82 10*3/mm3      Eosinophils, Absolute 0.34 10*3/mm3      Basophils, Absolute 0.05 10*3/mm3      Immature Grans, Absolute 0.02 10*3/mm3      nRBC 0.0 /100 WBC     Magnesium [[id number]] Collected: 01/22/24 0538    Specimen: Blood Updated: 01/22/24 0948            Imaging Results (Last 24 Hours)       ** No results found for the last 24 hours. **                No orders to display        Assessment/Plan     Active Hospital Problems    Diagnosis  POA    **Angioedema of tongue [T78.3XXA]  Yes    HTN (hypertension) [I10]  Yes    PVC (premature ventricular contraction) [I49.3]  Yes      Resolved Hospital Problems   No resolved problems to display.       Ms. Campuzano is a 78 y.o. female with history of hypertension presenting with acute onset of angioedema earlier this morning.  Suspect related to losartan use though with further discussion below    Patient clearly improving since initial presentation.  Continue Benadryl, Pepcid.  I have added cetirizine and will also start prednisone with plans for short course.  Close monitoring of airway.  No signs of compromise currently, low threshold for intensivist consultation if things are worsening  Obviously would hold losartan.  Likely will have to replace with another agent, perhaps amlodipine  Patient with frequent PVCs on the monitor.  Probably would benefit from a change to her beta-blocker as she says propranolol is really for  headaches and really does not help headaches anyway.  Would consider metoprolol or carvedilol but she would like to discuss this further with her PCP  Checking magnesium.  Replace if low  Would suggest allergy consultation in the outpatient setting given that she did have 1 prior episode to this and may warrant further testing.  Added complement levels to her blood work as per recommendations and up-to-date.    VTE Prophylaxis - SCDs.  Code Status - Full code.    Patient apparently sees nabil GLEASON.  Not certain they come to the hospital, nursing to reach out.  I will continue to manage until this is confirmed and I am happy to continue as attending in her case.  I think she likely can be discharged home this afternoon if she continues to improve.       Ed Silvestre MD  [facility]  01/22/24  09:55 EST

## 2024-09-18 NOTE — PROGRESS NOTES
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  She is continues to do her activities her past medical history is remarkable for those issues listed below        Allergies: No Known Allergies    Medications:   Home Medications:  Current Outpatient Medications on File Prior to Visit   Medication Sig    butalbital-acetaminophen-caffeine (FIORICET, ESGIC) -40 MG per tablet Take 1 tablet by mouth Every 4 (Four) Hours As Needed for Headache.    Estrogens Conjugated (PREMARIN) 0.625 MG/GM vaginal cream Insert 1 g into the vagina Daily.    famotidine (Pepcid) 20 MG tablet Take 1 tablet by mouth 2 (Two) Times a Day.    hydrOXYzine (ATARAX) 10 MG tablet Take 1 tablet by mouth 3 times a day.    Thyroid 60 MG PO tablet Take 1 tablet by mouth Daily.    butalbital-acetaminophen-caffeine (FIORICET, ESGIC) -40 MG per tablet Take 1 tablet by mouth.    carvedilol (COREG) 3.125 MG tablet Take 1 tablet by mouth 2 (Two) Times a Day With Meals. (Patient not taking: Reported on 9/30/2024)    cetirizine (zyrTEC) 10 MG tablet Take 1 tablet by mouth Daily. (Patient not taking: Reported on 9/30/2024)    losartan (COZAAR) 100 MG tablet Take 1 tablet by mouth Daily.    multivitamin (THERAGRAN) tablet tablet Take 1 tablet by mouth Daily.    omega-3 acid ethyl esters (Lovaza) 1 g capsule Take 2 capsules by mouth 2 (Two) Times a Day. (Patient not taking: Reported on 9/30/2024)  "   omeprazole (priLOSEC) 20 MG capsule Take 1 capsule by mouth Daily. (Patient not taking: Reported on 9/30/2024)    predniSONE (DELTASONE) 20 MG tablet Take 1 tablet by mouth Daily. (Patient not taking: Reported on 9/30/2024)     No current facility-administered medications on file prior to visit.     Current Medications:  Scheduled Meds:  Continuous Infusions:No current facility-administered medications for this visit.    PRN Meds:.    History reviewed. No pertinent past medical history.     Past Surgical History:   Procedure Laterality Date    COSMETIC SURGERY      face lift 2019        Social History     Occupational History    Not on file   Tobacco Use    Smoking status: Never    Smokeless tobacco: Never   Vaping Use    Vaping status: Never Used   Substance and Sexual Activity    Alcohol use: Never    Drug use: Never    Sexual activity: Defer      Social History     Social History Narrative    Not on file      History reviewed. No pertinent family history.          Review of Systems:     Review of Systems      Physical Exam: 79 y.o. female  General Appearance:    Alert, cooperative, in no acute distress                   Vitals:    09/30/24 1413   Temp: 96.7 °F (35.9 °C)   Weight: 82 kg (180 lb 11.2 oz)   Height: 165.1 cm (65\")   PainSc:   4   PainLoc: Knee      Patient is alert and read ×3 no acute distress appears her above-listed at height weight and age.  Affect is normal respiratory rate is normal unlabored. Heart rate regular rate rhythm, sclera, dentition and hearing are normal for the purpose of this exam.        Ortho Exam  Physical exam of the right knee reveals no effusion no redness.  The patient does have tenderness about the medial joint line.  No tenderness about the lateral joint line.  A negative bounce home and a positive medial Mehnaz.  There is some pain medially  with a lateral Mehnaz.  Patient has a stable ligamentous exam.  Quads are reasonable and symmetric bilaterally.  Calf is soft " and nontender.  There is no overlying skin changes no lymphedema lymphadenopathy.  Patient has good hip range of motion full symmetric and asymptomatic and a normal ankle exam.   Large Joint Arthrocentesis: R knee  Date/Time: 9/30/2024 2:41 PM  Consent given by: patient  Site marked: site marked  Timeout: Immediately prior to procedure a time out was called to verify the correct patient, procedure, equipment, support staff and site/side marked as required   Supporting Documentation  Indications: pain   Procedure Details  Location: knee - R knee  Preparation: Patient was prepped and draped in the usual sterile fashion  Needle gauge: 21G.  Medications administered: 1 mL methylPREDNISolone acetate 80 MG/ML; 2 mL lidocaine PF 1% 1 %  Patient tolerance: patient tolerated the procedure well with no immediate complications                 Radiology:   AP, Lateral and merchant views of the right knee  were ordered/reviewed to evauateknee pain.  I have no comparative films she has some patellofemoral OA otherwise good maintenance of her joint space  Imaging Results (Most Recent)       Procedure Component Value Units Date/Time    XR Knee 3 View Right [[id number]] Resulted: 09/30/24 1409     Updated: 09/30/24 1409    Impression:      Ordering physician's impression is located in the Encounter Note dated 09/30/24. X-ray performed in the DR room.          Assessment/Plan:    Right knee pain I am concerned about a meniscal tear especially with the intermittent, mechanical nature of her symptoms plan is to proceed with an injection as diagnostic and therapeutic tool I will start her in physical therapy to work on quad and core strengthening should she fail to improve adequately to allow to return her activities we could do an MRI

## 2024-09-30 ENCOUNTER — OFFICE VISIT (OUTPATIENT)
Dept: ORTHOPEDIC SURGERY | Facility: CLINIC | Age: 79
End: 2024-09-30
Payer: MEDICARE

## 2024-09-30 VITALS — WEIGHT: 180.7 LBS | TEMPERATURE: 96.7 F | HEIGHT: 65 IN | BODY MASS INDEX: 30.1 KG/M2

## 2024-09-30 DIAGNOSIS — R52 PAIN: Primary | ICD-10-CM

## 2024-09-30 DIAGNOSIS — M17.10 PRIMARY LOCALIZED OSTEOARTHROSIS OF LOWER LEG, UNSPECIFIED LATERALITY: ICD-10-CM

## 2024-09-30 DIAGNOSIS — S83.241A TEAR OF MEDIAL MENISCUS OF RIGHT KNEE, CURRENT, UNSPECIFIED TEAR TYPE, INITIAL ENCOUNTER: ICD-10-CM

## 2024-09-30 RX ORDER — DIPHENOXYLATE HYDROCHLORIDE AND ATROPINE SULFATE 2.5; .025 MG/1; MG/1
1 TABLET ORAL DAILY
COMMUNITY

## 2024-09-30 RX ORDER — LOSARTAN POTASSIUM 100 MG/1
100 TABLET ORAL DAILY
COMMUNITY

## 2024-09-30 RX ORDER — BUTALBITAL, ACETAMINOPHEN AND CAFFEINE 50; 325; 40 MG/1; MG/1; MG/1
1 TABLET ORAL
COMMUNITY

## 2024-09-30 RX ORDER — HYDROXYZINE HYDROCHLORIDE 10 MG/1
1 TABLET, FILM COATED ORAL 3 TIMES DAILY
COMMUNITY
Start: 2024-08-30

## 2024-09-30 RX ADMIN — LIDOCAINE HYDROCHLORIDE 2 ML: 10 INJECTION, SOLUTION EPIDURAL; INFILTRATION; INTRACAUDAL; PERINEURAL at 14:41

## 2024-09-30 RX ADMIN — METHYLPREDNISOLONE ACETATE 1 ML: 80 INJECTION, SUSPENSION INTRA-ARTICULAR; INTRALESIONAL; INTRAMUSCULAR; SOFT TISSUE at 14:41

## 2024-10-01 RX ORDER — METHYLPREDNISOLONE ACETATE 80 MG/ML
1 INJECTION, SUSPENSION INTRA-ARTICULAR; INTRALESIONAL; INTRAMUSCULAR; SOFT TISSUE
Status: COMPLETED | OUTPATIENT
Start: 2024-09-30 | End: 2024-09-30

## 2024-10-01 RX ORDER — LIDOCAINE HYDROCHLORIDE 10 MG/ML
2 INJECTION, SOLUTION EPIDURAL; INFILTRATION; INTRACAUDAL; PERINEURAL
Status: COMPLETED | OUTPATIENT
Start: 2024-09-30 | End: 2024-09-30

## 2024-10-21 ENCOUNTER — TELEPHONE (OUTPATIENT)
Dept: PHYSICAL THERAPY | Facility: CLINIC | Age: 79
End: 2024-10-21

## 2024-11-25 NOTE — PROGRESS NOTES
Patient: Antionette Campuzano  YOB: 1945  Date of Service: 11/25/2024    Chief Complaints: Right knee pain    Subjective:    History of Present Illness: Pt is seen in the office today with complaints of right knee pain I saw her end of September we injected her she got really good relief for 2 weeks and then has had some improvement her symptoms are starting to return she wanted to see if she could get another injection or what the next step would be        Allergies: No Known Allergies    Medications:   Home Medications:  Current Outpatient Medications on File Prior to Visit   Medication Sig    butalbital-acetaminophen-caffeine (FIORICET, ESGIC) -40 MG per tablet Take 1 tablet by mouth Every 4 (Four) Hours As Needed for Headache.    butalbital-acetaminophen-caffeine (FIORICET, ESGIC) -40 MG per tablet Take 1 tablet by mouth.    carvedilol (COREG) 3.125 MG tablet Take 1 tablet by mouth 2 (Two) Times a Day With Meals. (Patient not taking: Reported on 9/30/2024)    cetirizine (zyrTEC) 10 MG tablet Take 1 tablet by mouth Daily. (Patient not taking: Reported on 9/30/2024)    Estrogens Conjugated (PREMARIN) 0.625 MG/GM vaginal cream Insert 1 g into the vagina Daily.    famotidine (Pepcid) 20 MG tablet Take 1 tablet by mouth 2 (Two) Times a Day.    hydrOXYzine (ATARAX) 10 MG tablet Take 1 tablet by mouth 3 times a day.    losartan (COZAAR) 100 MG tablet Take 1 tablet by mouth Daily.    multivitamin (THERAGRAN) tablet tablet Take 1 tablet by mouth Daily.    omega-3 acid ethyl esters (Lovaza) 1 g capsule Take 2 capsules by mouth 2 (Two) Times a Day. (Patient not taking: Reported on 9/30/2024)    omeprazole (priLOSEC) 20 MG capsule Take 1 capsule by mouth Daily. (Patient not taking: Reported on 9/30/2024)    predniSONE (DELTASONE) 20 MG tablet Take 1 tablet by mouth Daily. (Patient not taking: Reported on 9/30/2024)    Thyroid 60 MG PO tablet Take 1 tablet by mouth Daily.     No current  facility-administered medications on file prior to visit.     Current Medications:  Scheduled Meds:  Continuous Infusions:No current facility-administered medications for this visit.    PRN Meds:.    I have reviewed the patient's medical history in detail and updated the computerized patient record.  Review and summarization of old records include:    No past medical history on file.     Past Surgical History:   Procedure Laterality Date    COSMETIC SURGERY      face lift 2019        Social History     Occupational History    Not on file   Tobacco Use    Smoking status: Never    Smokeless tobacco: Never   Vaping Use    Vaping status: Never Used   Substance and Sexual Activity    Alcohol use: Never    Drug use: Never    Sexual activity: Defer      Social History     Social History Narrative    Not on file      No family history on file.    ROS: 14 point review of systems was performed and was negative except for documented findings in HPI and today's encounter.     Allergies: No Known Allergies  Constitutional:  Denies fever, shaking or chills   Eyes:  Denies change in visual acuity   HENT:  Denies nasal congestion or sore throat   Respiratory:  Denies cough or shortness of breath   Cardiovascular:  Denies chest pain or severe LE edema   GI:  Denies abdominal pain, nausea, vomiting, bloody stools or diarrhea   Musculoskeletal:  Numbness, tingling, or loss of motor function only as noted above in history of present illness.  : Denies painful urination or hematuria  Integument:  Denies rash, lesion or ulceration   Neurologic:  Denies headache or focal weakness  Endocrine:  Denies lymphadenopathy  Psych:  Denies confusion or change in mental status   Hem:  Denies active bleeding      Physical Exam: 79 y.o. female  Wt Readings from Last 3 Encounters:   09/30/24 82 kg (180 lb 11.2 oz)   01/22/24 83.9 kg (185 lb)       There is no height or weight on file to calculate BMI.    There were no vitals filed for this  visit.  Vital signs reviewed.   General Appearance:    Alert, cooperative, in no acute distress                    Ortho exam    Physical exam of the right knee reveals no effusion, no erythema.  The patient has no palpable tenderness along the medial joint line, no tenderness about the lateral joint line.  Patient does have crepitus with patellofemoral range of motion.  They also have subjective symptoms anteriorly during exam.  The patient has a negative bounce home, negative Mehnaz and a stable ligamentous exam.  Quad tone is reasonable and symmetric.  There are no overlying skin changes no lymphedema no lymphadenopathy.  There is good hip range of motion which is full symmetric and asymptomatic and a normal ankle exam.  Hamstrings and IT band are tight bilaterally.                Assessment: Right knee pain she states it bothers her but not enough to have surgery we were thinking about getting an MRI but if it is not going to change our course of treatment we probably will not I think I will encourage her to get to physical therapy I will put another order in and I will see her back in a month at that point I could inject her knee again    Plan: As above  Follow up as indicated.  Ice, elevate, and rest as needed.  Discussed conservative measures of pain control including ice, bracing.  Also talked about the importance of strengthening   Audrey Kendrick M.D.

## 2024-11-26 ENCOUNTER — OFFICE VISIT (OUTPATIENT)
Dept: ORTHOPEDIC SURGERY | Facility: CLINIC | Age: 79
End: 2024-11-26
Payer: MEDICARE

## 2024-11-26 VITALS — BODY MASS INDEX: 29.09 KG/M2 | WEIGHT: 181 LBS | TEMPERATURE: 97.5 F | HEIGHT: 66 IN

## 2024-11-26 DIAGNOSIS — M17.11 PRIMARY LOCALIZED OSTEOARTHROSIS OF RIGHT LOWER LEG: ICD-10-CM

## 2024-11-26 DIAGNOSIS — M17.10 PATELLOFEMORAL ARTHRITIS: Primary | ICD-10-CM

## 2024-11-26 RX ORDER — MULTIVIT-MIN/IRON/FOLIC ACID/K 18-600-40
1 CAPSULE ORAL DAILY
COMMUNITY

## 2024-11-26 RX ORDER — PROPRANOLOL HYDROCHLORIDE 60 MG/1
CAPSULE, EXTENDED RELEASE ORAL
COMMUNITY
Start: 2024-10-22

## 2024-12-12 NOTE — PROGRESS NOTES
Patient: Antionette Campuzano  YOB: 1945  Date of Service: 12/12/2024    Chief Complaints: Right knee pain    Subjective:    History of Present Illness: Pt is seen in the office today with complaints of right knee pain we last injected her several months ago she got good relief her symptoms have returned she is not interested in surgical she would like another injection      Allergies:   Allergies   Allergen Reactions    Sulfa Antibiotics Other (See Comments) and Rash     Head ache       Medications:   Home Medications:  Current Outpatient Medications on File Prior to Visit   Medication Sig    butalbital-acetaminophen-caffeine (FIORICET, ESGIC) -40 MG per tablet Take 1 tablet by mouth Every 4 (Four) Hours As Needed for Headache.    butalbital-acetaminophen-caffeine (FIORICET, ESGIC) -40 MG per tablet Take 1 tablet by mouth.    carvedilol (COREG) 3.125 MG tablet Take 1 tablet by mouth 2 (Two) Times a Day With Meals.    cetirizine (zyrTEC) 10 MG tablet Take 1 tablet by mouth Daily.    Cholecalciferol (Vitamin D) 50 MCG (2000 UT) capsule Take 1 capsule by mouth Daily.    Estrogens Conjugated (PREMARIN) 0.625 MG/GM vaginal cream Insert 1 g into the vagina Daily.    famotidine (Pepcid) 20 MG tablet Take 1 tablet by mouth 2 (Two) Times a Day.    hydrOXYzine (ATARAX) 10 MG tablet Take 1 tablet by mouth 3 times a day.    losartan (COZAAR) 100 MG tablet Take 1 tablet by mouth Daily.    multivitamin (THERAGRAN) tablet tablet Take 1 tablet by mouth Daily.    omega-3 acid ethyl esters (Lovaza) 1 g capsule Take 2 capsules by mouth 2 (Two) Times a Day.    omeprazole (priLOSEC) 20 MG capsule Take 1 capsule by mouth Daily.    predniSONE (DELTASONE) 20 MG tablet Take 1 tablet by mouth Daily.    propranolol LA (INDERAL LA) 60 MG 24 hr capsule     Thyroid 60 MG PO tablet Take 1 tablet by mouth Daily.     No current facility-administered medications on file prior to visit.     Current Medications:  Scheduled  Meds:  Continuous Infusions:No current facility-administered medications for this visit.    PRN Meds:.    I have reviewed the patient's medical history in detail and updated the computerized patient record.  Review and summarization of old records include:    No past medical history on file.     Past Surgical History:   Procedure Laterality Date    COSMETIC SURGERY      face lift 2019        Social History     Occupational History    Not on file   Tobacco Use    Smoking status: Never    Smokeless tobacco: Never   Vaping Use    Vaping status: Never Used   Substance and Sexual Activity    Alcohol use: Never    Drug use: Never    Sexual activity: Defer      Social History     Social History Narrative    Not on file      No family history on file.    ROS: 14 point review of systems was performed and was negative except for documented findings in HPI and today's encounter.     Allergies:   Allergies   Allergen Reactions    Sulfa Antibiotics Other (See Comments) and Rash     Head ache     Constitutional:  Denies fever, shaking or chills   Eyes:  Denies change in visual acuity   HENT:  Denies nasal congestion or sore throat   Respiratory:  Denies cough or shortness of breath   Cardiovascular:  Denies chest pain or severe LE edema   GI:  Denies abdominal pain, nausea, vomiting, bloody stools or diarrhea   Musculoskeletal:  Numbness, tingling, or loss of motor function only as noted above in history of present illness.  : Denies painful urination or hematuria  Integument:  Denies rash, lesion or ulceration   Neurologic:  Denies headache or focal weakness  Endocrine:  Denies lymphadenopathy  Psych:  Denies confusion or change in mental status   Hem:  Denies active bleeding      Physical Exam: 79 y.o. female  Wt Readings from Last 3 Encounters:   11/26/24 82.1 kg (181 lb)   09/30/24 82 kg (180 lb 11.2 oz)   01/22/24 83.9 kg (185 lb)       There is no height or weight on file to calculate BMI.    There were no vitals filed  for this visit.  Vital signs reviewed.   General Appearance:    Alert, cooperative, in no acute distress                    Ortho exam          Exam is unchanged      Assessment: Right knee pain some of it is degenerative she could have meniscal pathology to a thing is reasonable to inject if her symptoms continue to recur we might consider other means of testing  Plan: Injection  Follow up as indicated.  Ice, elevate, and rest as needed.  Discussed conservative measures of pain control including ice, bracing.    Large Joint Arthrocentesis: R knee  Date/Time: 12/17/2024 2:45 PM  Consent given by: patient  Site marked: site marked  Timeout: Immediately prior to procedure a time out was called to verify the correct patient, procedure, equipment, support staff and site/side marked as required   Supporting Documentation  Indications: pain   Procedure Details  Location: knee - R knee  Preparation: Patient was prepped and draped in the usual sterile fashion  Needle gauge: 21G.  Medications administered: 1 mL methylPREDNISolone acetate 80 MG/ML; 2 mL lidocaine PF 1% 1 %  Patient tolerance: patient tolerated the procedure well with no immediate complications      Audrey Kendrick M.D.

## 2024-12-17 ENCOUNTER — OFFICE VISIT (OUTPATIENT)
Dept: ORTHOPEDIC SURGERY | Facility: CLINIC | Age: 79
End: 2024-12-17
Payer: MEDICARE

## 2024-12-17 VITALS — TEMPERATURE: 97.2 F | BODY MASS INDEX: 29.18 KG/M2 | WEIGHT: 181.6 LBS | HEIGHT: 66 IN

## 2024-12-17 DIAGNOSIS — S83.241D OTHER TEAR OF MEDIAL MENISCUS OF RIGHT KNEE AS CURRENT INJURY, SUBSEQUENT ENCOUNTER: Primary | ICD-10-CM

## 2024-12-17 RX ADMIN — LIDOCAINE HYDROCHLORIDE 2 ML: 10 INJECTION, SOLUTION EPIDURAL; INFILTRATION; INTRACAUDAL; PERINEURAL at 14:45

## 2024-12-17 RX ADMIN — METHYLPREDNISOLONE ACETATE 1 ML: 80 INJECTION, SUSPENSION INTRA-ARTICULAR; INTRALESIONAL; INTRAMUSCULAR; SOFT TISSUE at 14:45

## 2024-12-18 RX ORDER — METHYLPREDNISOLONE ACETATE 80 MG/ML
1 INJECTION, SUSPENSION INTRA-ARTICULAR; INTRALESIONAL; INTRAMUSCULAR; SOFT TISSUE
Status: COMPLETED | OUTPATIENT
Start: 2024-12-17 | End: 2024-12-17

## 2024-12-18 RX ORDER — LIDOCAINE HYDROCHLORIDE 10 MG/ML
2 INJECTION, SOLUTION EPIDURAL; INFILTRATION; INTRACAUDAL; PERINEURAL
Status: COMPLETED | OUTPATIENT
Start: 2024-12-17 | End: 2024-12-17

## 2025-02-04 ENCOUNTER — TELEPHONE (OUTPATIENT)
Dept: ORTHOPEDIC SURGERY | Facility: CLINIC | Age: 80
End: 2025-02-04

## 2025-02-04 DIAGNOSIS — S83.241D ACUTE MEDIAL MENISCUS TEAR OF RIGHT KNEE, SUBSEQUENT ENCOUNTER: Primary | ICD-10-CM

## 2025-02-04 NOTE — TELEPHONE ENCOUNTER
Caller: Antionette Campuzano    Relationship: Self    Best call back number: [phone]    What was the call regarding: PT CALLING TO REQUEST R KNEE MRI ORDER FROM DR ANDERSON. PT REQUESTING ANY FACILITY IN [place] THAT CAN GET HER IN SOONEST. PLEASE CONTACT PT TO DISCUSS OPTIONS FOR MRI FACILITY.

## 2025-03-06 ENCOUNTER — HOSPITAL ENCOUNTER (OUTPATIENT)
Dept: MRI IMAGING | Facility: HOSPITAL | Age: 80
Discharge: HOME | End: 2025-03-06
Admitting: ORTHOPAEDIC SURGERY
Payer: MEDICARE

## 2025-03-06 DIAGNOSIS — S83.241D ACUTE MEDIAL MENISCUS TEAR OF RIGHT KNEE, SUBSEQUENT ENCOUNTER: ICD-10-CM

## 2025-03-06 PROCEDURE — 73721 MRI JNT OF LWR EXTRE W/O DYE: CPT

## 2025-03-10 NOTE — PROGRESS NOTES
Shoulder MRI Follow Up      Patient: Antionette Campuzano        YOB: 1945            Chief Complaints: Shoulder pain      History of Present Illness: The patient is here follow-up of an MRI of the shoulder      Physical Exam: 79 y.o. female  General Appearance:    Alert, cooperative, in no acute distress                 There were no vitals filed for this visit.     Patient is alert and read ×3 no acute distress appears her above-listed at height weight and age.  Affect is normal respiratory rate is normal unlabored. Heart rate regular rate rhythm, sclera, dentition and hearing are normal for the purpose of this exam.      Ortho Exam      MRI Results:  Procedures      Assessment/Plan:

## 2025-03-11 ENCOUNTER — OFFICE VISIT (OUTPATIENT)
Dept: ORTHOPEDIC SURGERY | Facility: CLINIC | Age: 80
End: 2025-03-11
Payer: MEDICARE

## 2025-03-11 ENCOUNTER — TRANSCRIBE ORDERS (OUTPATIENT)
Dept: ADMINISTRATIVE | Facility: HOSPITAL | Age: 80
End: 2025-03-11
Payer: MEDICARE

## 2025-03-11 VITALS — WEIGHT: 180.4 LBS | BODY MASS INDEX: 28.99 KG/M2 | HEIGHT: 66 IN | TEMPERATURE: 96.5 F

## 2025-03-11 DIAGNOSIS — B19.20 HEPATITIS C VIRUS INFECTION WITHOUT HEPATIC COMA, UNSPECIFIED CHRONICITY: Primary | ICD-10-CM

## 2025-03-11 DIAGNOSIS — S83.241D OTHER TEAR OF MEDIAL MENISCUS OF RIGHT KNEE AS CURRENT INJURY, SUBSEQUENT ENCOUNTER: ICD-10-CM

## 2025-03-11 DIAGNOSIS — R52 PAIN: Primary | ICD-10-CM

## 2025-03-11 DIAGNOSIS — S83.281D OTHER TEAR OF LATERAL MENISCUS OF RIGHT KNEE AS CURRENT INJURY, SUBSEQUENT ENCOUNTER: ICD-10-CM

## 2025-03-12 NOTE — PROGRESS NOTES
Patient: Antionette Campuzano  YOB: 1945  Date of Service: 3/12/2025    Chief Complaints: Right knee pain    Subjective:    History of Present Illness: Pt is seen in the office today with complaints of right knee pain she is here follow-up of right knee MRI.  MRI demonstrates a large lateral meniscus tear as well as a radial tear of the medial meniscus she states she is miserable she states there are times where she has no pain in times where it is a 10 out of 10 she has significant night pain which is a big complaint she has failed conservative management form of injections physical therapy activity modification.         Allergies:   Allergies   Allergen Reactions    Sulfa Antibiotics Other (See Comments) and Rash     Head ache       Medications:   Home Medications:  Current Outpatient Medications on File Prior to Visit   Medication Sig    butalbital-acetaminophen-caffeine (FIORICET, ESGIC) -40 MG per tablet Take 1 tablet by mouth Every 4 (Four) Hours As Needed for Headache.    butalbital-acetaminophen-caffeine (FIORICET, ESGIC) -40 MG per tablet Take 1 tablet by mouth.    carvedilol (COREG) 3.125 MG tablet Take 1 tablet by mouth 2 (Two) Times a Day With Meals.    cetirizine (zyrTEC) 10 MG tablet Take 1 tablet by mouth Daily.    Cholecalciferol (Vitamin D) 50 MCG (2000 UT) capsule Take 1 capsule by mouth Daily.    Estrogens Conjugated (PREMARIN) 0.625 MG/GM vaginal cream Insert 1 g into the vagina Daily.    famotidine (Pepcid) 20 MG tablet Take 1 tablet by mouth 2 (Two) Times a Day.    hydrOXYzine (ATARAX) 10 MG tablet Take 1 tablet by mouth 3 times a day.    losartan (COZAAR) 100 MG tablet Take 1 tablet by mouth Daily.    multivitamin (THERAGRAN) tablet tablet Take 1 tablet by mouth Daily.    omega-3 acid ethyl esters (Lovaza) 1 g capsule Take 2 capsules by mouth 2 (Two) Times a Day.    omeprazole (priLOSEC) 20 MG capsule Take 1 capsule by mouth Daily.    predniSONE (DELTASONE) 20 MG tablet  Take 1 tablet by mouth Daily.    propranolol LA (INDERAL LA) 60 MG 24 hr capsule     Thyroid 60 MG PO tablet Take 1 tablet by mouth Daily.     No current facility-administered medications on file prior to visit.     Current Medications:  Scheduled Meds:  Continuous Infusions:No current facility-administered medications for this visit.    PRN Meds:.    I have reviewed the patient's medical history in detail and updated the computerized patient record.  Review and summarization of old records include:    History reviewed. No pertinent past medical history.     Past Surgical History:   Procedure Laterality Date    COSMETIC SURGERY      face lift 2019        Social History     Occupational History    Not on file   Tobacco Use    Smoking status: Never    Smokeless tobacco: Never   Vaping Use    Vaping status: Never Used   Substance and Sexual Activity    Alcohol use: Never    Drug use: Never    Sexual activity: Defer      Social History     Social History Narrative    Not on file      History reviewed. No pertinent family history.    ROS: 14 point review of systems was performed and was negative except for documented findings in HPI and today's encounter.     Allergies:   Allergies   Allergen Reactions    Sulfa Antibiotics Other (See Comments) and Rash     Head ache     Constitutional:  Denies fever, shaking or chills   Eyes:  Denies change in visual acuity   HENT:  Denies nasal congestion or sore throat   Respiratory:  Denies cough or shortness of breath   Cardiovascular:  Denies chest pain or severe LE edema   GI:  Denies abdominal pain, nausea, vomiting, bloody stools or diarrhea   Musculoskeletal:  Numbness, tingling, or loss of motor function only as noted above in history of present illness.  : Denies painful urination or hematuria  Integument:  Denies rash, lesion or ulceration   Neurologic:  Denies headache or focal weakness  Endocrine:  Denies lymphadenopathy  Psych:  Denies confusion or change in mental  "status   Hem:  Denies active bleeding      Physical Exam: 79 y.o. female  Wt Readings from Last 3 Encounters:   03/11/25 81.8 kg (180 lb 6.4 oz)   12/17/24 82.4 kg (181 lb 9.6 oz)   11/26/24 82.1 kg (181 lb)       Body mass index is 29.12 kg/m².    Vitals:    03/11/25 1446   Temp: 96.5 °F (35.8 °C)     Vital signs reviewed.   General Appearance:    Alert, cooperative, in no acute distress                    Ortho exam      Physical exam of the right knee reveals no effusion no redness.  The patient does have tenderness about the lateral joint line.  No tenderness about the medial joint line.  A negative bounce home and a positive lateral Mehnaz.    Patient has a stable ligamentous exam.  The patient has a negative Lachman and negative anterior drawer and a negative pivot shift.  Quads are reasonable and symmetric bilaterally.  Calf is soft and nontender.  There is no overlying skin changes no lymphedema lymphadenopathy.  Patient has good hip range of motion full symmetric and asymptomatic and a normal ankle exam     Physical Exam: 79 y.o. female  General Appearance:    Alert, cooperative, in no acute distress                      Vitals:    03/11/25 1446   Temp: 96.5 °F (35.8 °C)   TempSrc: Temporal   Weight: 81.8 kg (180 lb 6.4 oz)   Height: 167.6 cm (66\")   PainSc: 5    PainLoc: Knee        Head:    Normocephalic, without obvious abnormality, atraumatic   Eyes:            conjunctivae and sclerae normal, no pallor, corneas clear,    Ears:    Ears appear intact with no abnormalities noted   Throat:   No oral lesions, no thrush, oral mucosa moist   Neck:   No adenopathy, supple, trachea midline, no thyromegaly,    Back:     No kyphosis present, no scoliosis present, no skin lesions,      erythema or scars, no tenderness to percussion or                   palpation,   range of motion normal   Lungs:     ,respirations regular, even and                  unlabored    Heart:    Regular rhythm and normal rate            "    Chest Wall:    No abnormalities observed               Pulses:   Pulses palpable and equal bilaterally   Skin:   No bleeding, bruising or rash   Lymph nodes:   No palpable adenopathy   Neurologic:   Appears neurologic intact         MRI is as above I have reviewed and agree      Assessment: Right knee medial and lateral meniscus tear she has true mechanical symptoms she is worried she is going to fall and she has night pain she cannot sleep she wishes to proceed with arthroscopy we did discuss in detail risk benefits and alternatives  The patient voiced understanding of the risks, benefits, and alternative forms of treatment that were discussed and the patient consents to proceed with the above listed surgery.  All risks, benefits and alternatives were discussed.  Risks including to but not exclusive to anesthetic complications, including death, MI, CVA, infection, bleeding DVT, fracture, residual pain and need for future surgery.  She understands and agrees to proceed    Plan: As above  Follow up as indicated.  Ice, elevate, and rest as needed.  Discussed conservative measures of pain control including ice, bracing.  Audrey Kendrick M.D.